# Patient Record
Sex: FEMALE | Race: WHITE | Employment: OTHER | URBAN - METROPOLITAN AREA
[De-identification: names, ages, dates, MRNs, and addresses within clinical notes are randomized per-mention and may not be internally consistent; named-entity substitution may affect disease eponyms.]

---

## 2017-02-14 ENCOUNTER — HOSPITAL ENCOUNTER (OUTPATIENT)
Dept: CT IMAGING | Age: 67
Discharge: HOME | End: 2017-02-14
Attending: FAMILY MEDICINE
Payer: MEDICARE

## 2017-02-14 DIAGNOSIS — G91.9 HYDROCEPHALUS (HCC): ICD-10-CM

## 2017-02-14 PROCEDURE — 70450 CT HEAD/BRAIN W/O DYE: CPT

## 2018-08-23 ENCOUNTER — HOSPITAL ENCOUNTER (OUTPATIENT)
Dept: ULTRASOUND IMAGING | Age: 68
Discharge: HOME | End: 2018-08-23
Attending: FAMILY MEDICINE
Payer: MEDICARE

## 2018-08-23 DIAGNOSIS — M79.89 SWOLLEN LEG: ICD-10-CM

## 2018-08-23 PROCEDURE — 93970 EXTREMITY STUDY: CPT

## 2019-02-18 ENCOUNTER — HOSPITAL ENCOUNTER (OUTPATIENT)
Dept: MRI IMAGING | Age: 69
Discharge: HOME | End: 2019-02-18
Attending: PSYCHIATRY & NEUROLOGY
Payer: MEDICARE

## 2019-02-18 DIAGNOSIS — R51.9 UNILATERAL OCCIPITAL HEADACHE: ICD-10-CM

## 2019-02-18 PROCEDURE — 70551 MRI BRAIN STEM W/O DYE: CPT

## 2020-09-30 ENCOUNTER — HOSPITAL ENCOUNTER (OUTPATIENT)
Dept: MRI IMAGING | Age: 70
Discharge: HOME | End: 2020-09-30
Attending: PHYSICAL MEDICINE & REHABILITATION
Payer: MEDICARE

## 2020-09-30 DIAGNOSIS — M51.36 DDD (DEGENERATIVE DISC DISEASE), LUMBAR: ICD-10-CM

## 2020-09-30 DIAGNOSIS — M54.50 CHRONIC LOW BACK PAIN: ICD-10-CM

## 2020-09-30 DIAGNOSIS — G89.29 CHRONIC LOW BACK PAIN: ICD-10-CM

## 2020-09-30 DIAGNOSIS — M47.816 OSTEOARTHRITIS OF LUMBAR SPINE: ICD-10-CM

## 2020-09-30 PROCEDURE — 72148 MRI LUMBAR SPINE W/O DYE: CPT

## 2020-11-27 ENCOUNTER — TRANSCRIBE ORDER (OUTPATIENT)
Dept: SCHEDULING | Age: 70
End: 2020-11-27

## 2020-11-27 DIAGNOSIS — M50.30 DDD (DEGENERATIVE DISC DISEASE), CERVICAL: ICD-10-CM

## 2020-11-27 DIAGNOSIS — M54.6 PAIN IN THORACIC SPINE: ICD-10-CM

## 2020-11-27 DIAGNOSIS — M54.2 CERVICALGIA: Primary | ICD-10-CM

## 2020-11-27 DIAGNOSIS — M41.50 DEGENERATIVE SCOLIOSIS: ICD-10-CM

## 2020-11-27 DIAGNOSIS — M51.34 DDD (DEGENERATIVE DISC DISEASE), THORACIC: Primary | ICD-10-CM

## 2021-01-09 ENCOUNTER — HOSPITAL ENCOUNTER (OUTPATIENT)
Dept: MRI IMAGING | Age: 71
Discharge: HOME | End: 2021-01-09
Attending: NURSE PRACTITIONER
Payer: MEDICARE

## 2021-01-09 DIAGNOSIS — M50.30 DDD (DEGENERATIVE DISC DISEASE), CERVICAL: ICD-10-CM

## 2021-01-09 DIAGNOSIS — M51.34 DDD (DEGENERATIVE DISC DISEASE), THORACIC: ICD-10-CM

## 2021-01-09 DIAGNOSIS — M54.6 PAIN IN THORACIC SPINE: ICD-10-CM

## 2021-01-09 DIAGNOSIS — M54.2 CERVICALGIA: ICD-10-CM

## 2021-01-09 DIAGNOSIS — M41.50 DEGENERATIVE SCOLIOSIS: ICD-10-CM

## 2021-01-09 PROCEDURE — 72141 MRI NECK SPINE W/O DYE: CPT

## 2021-01-09 PROCEDURE — 72146 MRI CHEST SPINE W/O DYE: CPT

## 2021-01-13 ENCOUNTER — TRANSCRIBE ORDER (OUTPATIENT)
Dept: SCHEDULING | Age: 71
End: 2021-01-13

## 2021-01-13 DIAGNOSIS — Z87.891 PERSONAL HISTORY OF NICOTINE DEPENDENCE: Primary | ICD-10-CM

## 2021-01-21 ENCOUNTER — HOSPITAL ENCOUNTER (OUTPATIENT)
Dept: CT IMAGING | Age: 71
Discharge: HOME | End: 2021-01-21
Attending: INTERNAL MEDICINE
Payer: MEDICARE

## 2021-01-21 DIAGNOSIS — Z87.891 PERSONAL HISTORY OF NICOTINE DEPENDENCE: ICD-10-CM

## 2021-01-21 PROCEDURE — 71271 CT THORAX LUNG CANCER SCR C-: CPT

## 2021-02-12 RX ORDER — TRAMADOL HYDROCHLORIDE 50 MG/1
50 TABLET ORAL
COMMUNITY

## 2021-02-23 ENCOUNTER — APPOINTMENT (OUTPATIENT)
Dept: GENERAL RADIOLOGY | Age: 71
End: 2021-02-23
Attending: PHYSICAL MEDICINE & REHABILITATION
Payer: MEDICARE

## 2021-02-23 ENCOUNTER — HOSPITAL ENCOUNTER (OUTPATIENT)
Age: 71
Setting detail: OUTPATIENT SURGERY
Discharge: HOSPICE | End: 2021-02-23
Attending: PHYSICAL MEDICINE & REHABILITATION | Admitting: PHYSICAL MEDICINE & REHABILITATION
Payer: MEDICARE

## 2021-02-23 VITALS
BODY MASS INDEX: 36.54 KG/M2 | RESPIRATION RATE: 18 BRPM | TEMPERATURE: 98.3 F | HEART RATE: 65 BPM | DIASTOLIC BLOOD PRESSURE: 67 MMHG | OXYGEN SATURATION: 100 % | HEIGHT: 64 IN | WEIGHT: 214 LBS | SYSTOLIC BLOOD PRESSURE: 106 MMHG

## 2021-02-23 PROCEDURE — 76210000046 HC AMBSU PH II REC FIRST 0.5 HR: Performed by: PHYSICAL MEDICINE & REHABILITATION

## 2021-02-23 PROCEDURE — 2709999900 HC NON-CHARGEABLE SUPPLY: Performed by: PHYSICAL MEDICINE & REHABILITATION

## 2021-02-23 PROCEDURE — 76030000002 HC AMB SURG OR TIME FIRST 0.: Performed by: PHYSICAL MEDICINE & REHABILITATION

## 2021-02-23 PROCEDURE — 76000 FLUOROSCOPY <1 HR PHYS/QHP: CPT

## 2021-02-23 PROCEDURE — 74011000250 HC RX REV CODE- 250: Performed by: PHYSICAL MEDICINE & REHABILITATION

## 2021-02-23 PROCEDURE — 72020 X-RAY EXAM OF SPINE 1 VIEW: CPT

## 2021-02-23 PROCEDURE — 74011250636 HC RX REV CODE- 250/636: Performed by: PHYSICAL MEDICINE & REHABILITATION

## 2021-02-23 RX ORDER — BUPIVACAINE HYDROCHLORIDE 5 MG/ML
5 INJECTION, SOLUTION EPIDURAL; INTRACAUDAL ONCE
Status: COMPLETED | OUTPATIENT
Start: 2021-02-23 | End: 2021-02-23

## 2021-02-23 RX ORDER — LIDOCAINE HYDROCHLORIDE 20 MG/ML
5 INJECTION, SOLUTION INFILTRATION; PERINEURAL ONCE
Status: COMPLETED | OUTPATIENT
Start: 2021-02-23 | End: 2021-02-23

## 2021-02-23 RX ORDER — METHYLPREDNISOLONE ACETATE 40 MG/ML
40 INJECTION, SUSPENSION INTRA-ARTICULAR; INTRALESIONAL; INTRAMUSCULAR; SOFT TISSUE ONCE
Status: COMPLETED | OUTPATIENT
Start: 2021-02-23 | End: 2021-02-23

## 2021-02-23 NOTE — DISCHARGE INSTRUCTIONS
Discharge Instructions    Lumbar Facet Block/Medial Branch Block    You had a lumbar facet injection today. You will probably have some numbness in your lower back area for the next 6-8 hours. The steroids will slowly become effective, reducing your pain, over the next 2 weeks. You should begin feeling better after a few days, but it may take up to 2 weeks to notice the difference. The benefit you get from your injection will last a variable amount of time, depending on the severity of your spine problem. If the results you experience are significant, but not lasting a long time, you may be a candidate for a procedure that can be longer lasting (radiofrequency ablation of the nerves innervating the facet joints).  Pain:  Most people do not have any increase in pain after this injection. However, you might experience some soreness at the site of the injection. If this happens, putting an icepack over the sore area will help.  Bandage: You have a small bandage covering the site of the injection. You may remove it when you get home.  Restrictions:  Someone should drive you home after the injection. After that, you have no restrictions. You may resume your normal level of activity. You may take a shower or bath, and you may eat normally. You should continue your current exercised and/or therapy routine.  Medications:  Continue your current medications as prescribed. If your pain decreases, you may reduce the amount of your pain medicines. If you stopped taking anticoagulants or blood-thinners before the injection, start them tomorrow. If you have diabetes, your blood sugar may be elevated for a few days. Call your primary doctor with any questions.     Call Dr. Elena Arita at [phone] if you experience:   Fever (101 degrees Fahrenheit or greater)   Nausea or vomiting   Headache unrelieved by your normal pain medicine   Redness or swelling at the injection site that lasts more than 1 day   New numbness, tingling, weakness, or pain that you didnt have before the injection     If still having pain in 1-2 weeks, call office at [phone] for a follow up appointment. DISCHARGE SUMMARY from Nurse    The following personal items collected during your admission are returned to you:   Dental Appliance: Dental Appliances: Partials, Uppers, Lowers  Vision: Visual Aid: None  Hearing Aid:    Jewelry:    Clothing:    Other Valuables:    Valuables sent to safe: If you were given prescriptions, please review the written information on prescribed medications. · You will receive a Post Operative Call from one of the Recovery Room Nurses on the day after your surgery to check on you. It is very important for us to know how you are recovering after your surgery. · You may receive an e-mail or letter in the mail from [employer] regarding your experience with us in the Ambulatory Surgery Unit. Your feedback is valuable to us and we appreciate your participation in the survey. If you have not had your influenza or pneumococcal vaccines, please follow up with your primary care physician. The discharge information has been reviewed with the patient. The patient verbalized understanding.

## 2021-02-23 NOTE — PERIOP NOTES
Espiridion Kanner  1950  [id number]    Situation:  Verbal report given from: Macrelo Vegas CRNA  Procedure: Procedure(s):  BILATERAL T8-9, T9-10, T10-11 FACET INJECTION    Background:    Preoperative diagnosis: THORACIC SPONDYLOSIS    Postoperative diagnosis: THORACIC SPONDYLOSIS    :  Dr. John French:  Intra-procedure medications, procedure, and allergies reviewed        Recommendation:    Discharge patient home after discharge instructions reviewed with patient. Rest until local has worn off.

## 2021-02-23 NOTE — H&P
Procedural Case Note    2/23/2021    (2:59 PM)    Belle Rbob    1950   (66 y.o.)    [id number]    CC:  pain    ROS:   Complete ROS obtained, no CP, no SOB, no N or V    PMH:     Past Medical History:   Diagnosis Date    Anxiety and depression     Arthritis     Asthma     Chronic obstructive pulmonary disease ([facility] [address]     Fibromyalgia     Pneumonia 2007    left lung     Spinal stenosis     Unspecified sleep apnea     pt no longer uses CPAP, states she sleeps with a fan in her face        ALLERGIES:     Allergies   Allergen Reactions    Lyrica [Pregabalin] Swelling    Sulfa (Sulfonamide Antibiotics) Rash and Swelling       MEDS:     No current facility-administered medications for this encounter. Visit Vitals  Ht 5' 4\" (1.626 m)   Wt 95.3 kg (210 lb)   BMI 36.05 kg/m²     PE:  Gen: NAD  Head: normocephalic  Heart: RRR  Lungs: CTA keila  Abd: NT, ND, soft  Neuro: awake and alert  Skin: intact    IMPRESSION:   Thoracic DJD    Note:  The clinical status was discussed in detail with the patient. The procedure was again discussed and described in detail. All understand and accept the planned procedure and risks; reject other forms of treatment. All questions are answered.     Jacinto Jovel MD

## 2021-02-23 NOTE — OP NOTES
Facet Steroid Injection Operative Report    Indications: This is a 66 y.o. female who presents with mid back pain. She was positive for Thoracic DJD. The patient was admitted for surgery as conservative measures have failed. Date of Surgery: 2/23/2021    Preoperative Diagnosis: Thoracic DJD    Postoperative Diagnosis: Thoracic DJD    Surgeon(s) and Role:     * Alfredo Staples MD - Primary     Procedure:  Procedure(s):  BILATERAL T8-9, T9-10, T10-11 FACET INJECTION    Procedure in Detail:  After appropriate informed consent was obtained, the patient was taken to the operating suite and placed in the prone position on the operating table on appropriate padding. The thoracic region was prepped and draped in the usual sterile fashion. Intraoperative fluoroscopy was used to localize the thoracic spine. The skin was infiltrated with 2% lidocaine. An 25-g needle was advanced into the Shilo T8-9, T9-10, T10-11 facets under fluoroscopic guidance. Next, 4 ml of 0.5% marcaine and 80mg of Depo-Medrol were injected. The needle was removed from the patient. The patient was then turned back into the supine position on the stretcher and was taken to the Recovery Room in stable condition.     Estimated Blood Loss:  none     Specimens: None       Drains: None          Complications:  None    Signed By: Kameron Durán MD                        February 23, 2021

## 2021-02-23 NOTE — PERIOP NOTES
Permission received to review discharge instructions and discuss private health information with , Danielle Singh. Hand  are equally strong, bilat. Leg/foot pushes pulls are slightly weaker to right leg.  Pt states this is normal.

## 2021-02-23 NOTE — PERIOP NOTES
Discharge instructions reviewed w/pt. She verbalized understanding. Vss. Denies pain. Injection site intact. Push/pull to lower extremities strong and equal bilaterally. Pt discharged via wheelchair to car, accompanied by RN. Pt discharged awake and alert, respirations equal and unlabored, skin warm, dry, and intact. Pt and family members' questions and concerns addressed prior to discharge.

## 2021-04-09 RX ORDER — TIOTROPIUM BROMIDE AND OLODATEROL 3.124; 2.736 UG/1; UG/1
1 SPRAY, METERED RESPIRATORY (INHALATION) 2 TIMES DAILY
COMMUNITY

## 2021-04-09 NOTE — PERIOP NOTES
[facility]  Ambulatory Surgery Unit  Pre-operative Instructions    Procedure Date  Tuesday, April 13, 2021            Tentative Arrival Time 1315      1. On the day of your procedure, please report to the Ambulatory Surgery Unit Registration Desk and sign in at your designated time. The Ambulatory Surgery Unit is located in [facility] on the [facility] side of the \[facility]\"" across from the [address] [place] [address]. Please have all of your health insurance cards and a photo ID. 2. You must have someone with you to drive you home as directed by your surgeon. 3. You may have a light breakfast and take normal morning medications. 4. We recommend you do not drink any alcoholic beverages for 24 hours before and after your procedure. 5. Contact your surgeons office for instructions on the following medications: non-steroidal anti-inflammatory drugs (i.e. Advil, Aleve), vitamins, and supplements. (Some surgeons will want you to stop these medications prior to surgery and others may allow you to take them)   **If you are currently taking Plavix, Coumadin, Aspirin and/or other blood-thinning agents, contact your surgeon for instructions. ** Your surgeon will partner with the physician prescribing these medications to determine if it is safe to stop or if you need to continue taking. Please do not stop taking these medications without instructions from your surgeon. 6. In an effort to help prevent surgical site infection, we ask that you shower with an anti-bacterial soap (i.e. Dial or Safeguard) on the morning of your procedure. Do not apply any lotions, powders, or deodorants after showering. 7. Wear comfortable clothes. Wear glasses instead of contacts. Do not bring any jewelry or money (other than copays or fees as instructed). Do not wear make-up, particularly mascara, the morning of your procedure. Wear your hair loose or down, no ponytails, buns, joan pins or clips.  All body piercings must be removed. 8. You should understand that if you do not follow these instructions your procedure may be cancelled. If your physical condition changes (i.e. fever, cold or flu) please contact your surgeon as soon as possible. 9. It is important that you be on time. If a situation occurs where you may be late, or if you have any questions or problems, please call [phone].    10. Your procedure time may be subject to change. You will receive a phone call the day prior to confirm your arrival time. I understand a pre-operative phone call will be made to verify my procedure time. In the event that I am not available, I give permission for a message to be left on my answering service and/or with another person?       yes    Preop instructions reviewed  Pt verbalized understanding.      ___________________      ___________________      ___________________  (Signature of Patient)          (Witness)                   (Date and Time)

## 2021-04-13 ENCOUNTER — APPOINTMENT (OUTPATIENT)
Dept: GENERAL RADIOLOGY | Age: 71
End: 2021-04-13
Attending: PHYSICAL MEDICINE & REHABILITATION
Payer: MEDICARE

## 2021-04-13 ENCOUNTER — HOSPITAL ENCOUNTER (OUTPATIENT)
Age: 71
Setting detail: OUTPATIENT SURGERY
Discharge: HOME | End: 2021-04-13
Attending: PHYSICAL MEDICINE & REHABILITATION | Admitting: PHYSICAL MEDICINE & REHABILITATION
Payer: MEDICARE

## 2021-04-13 VITALS
SYSTOLIC BLOOD PRESSURE: 134 MMHG | TEMPERATURE: 97.9 F | OXYGEN SATURATION: 96 % | DIASTOLIC BLOOD PRESSURE: 80 MMHG | RESPIRATION RATE: 18 BRPM | WEIGHT: 208 LBS | HEIGHT: 64 IN | HEART RATE: 65 BPM | BODY MASS INDEX: 35.51 KG/M2

## 2021-04-13 PROCEDURE — 76030000002 HC AMB SURG OR TIME FIRST 0.: Performed by: PHYSICAL MEDICINE & REHABILITATION

## 2021-04-13 PROCEDURE — 77030003665 HC NDL SPN BBMI -A: Performed by: PHYSICAL MEDICINE & REHABILITATION

## 2021-04-13 PROCEDURE — 2709999900 HC NON-CHARGEABLE SUPPLY: Performed by: PHYSICAL MEDICINE & REHABILITATION

## 2021-04-13 PROCEDURE — 72020 X-RAY EXAM OF SPINE 1 VIEW: CPT

## 2021-04-13 PROCEDURE — 74011000250 HC RX REV CODE- 250: Performed by: PHYSICAL MEDICINE & REHABILITATION

## 2021-04-13 PROCEDURE — 76000 FLUOROSCOPY <1 HR PHYS/QHP: CPT

## 2021-04-13 PROCEDURE — 76210000046 HC AMBSU PH II REC FIRST 0.5 HR: Performed by: PHYSICAL MEDICINE & REHABILITATION

## 2021-04-13 PROCEDURE — 74011250636 HC RX REV CODE- 250/636: Performed by: PHYSICAL MEDICINE & REHABILITATION

## 2021-04-13 RX ORDER — METHYLPREDNISOLONE ACETATE 40 MG/ML
80 INJECTION, SUSPENSION INTRA-ARTICULAR; INTRALESIONAL; INTRAMUSCULAR; SOFT TISSUE ONCE
Status: COMPLETED | OUTPATIENT
Start: 2021-04-13 | End: 2021-04-13

## 2021-04-13 RX ORDER — BUPIVACAINE HYDROCHLORIDE 5 MG/ML
10 INJECTION, SOLUTION EPIDURAL; INTRACAUDAL ONCE
Status: COMPLETED | OUTPATIENT
Start: 2021-04-13 | End: 2021-04-13

## 2021-04-13 RX ORDER — LIDOCAINE HYDROCHLORIDE 20 MG/ML
10 INJECTION, SOLUTION INFILTRATION; PERINEURAL ONCE
Status: COMPLETED | OUTPATIENT
Start: 2021-04-13 | End: 2021-04-13

## 2021-04-13 NOTE — H&P
Procedural Case Note    4/13/2021    (1:46 PM)    Louis Hairston    1950   (66 y.o.)    [id number]    CC:  pain    ROS:   Complete ROS obtained, no CP, no SOB, no N or V    PMH:     Past Medical History:   Diagnosis Date    Anxiety and depression     Arthritis     Asthma     Chronic obstructive pulmonary disease ([facility] [address]     Fibromyalgia     Pneumonia 2007    left lung     Spinal stenosis     Unspecified sleep apnea     pt no longer uses CPAP, states she sleeps with a fan in her face        ALLERGIES:     Allergies   Allergen Reactions    Lyrica [Pregabalin] Swelling    Sulfa (Sulfonamide Antibiotics) Rash and Swelling       MEDS:     No current facility-administered medications for this encounter. Visit Vitals  Ht 5' 4\" (1.626 m)   Wt 95.3 kg (210 lb)   BMI 36.05 kg/m²     PE:  Gen: NAD  Head: normocephalic  Heart: RRR  Lungs: CTA keila  Abd: NT, ND, soft  Neuro: awake and alert  Skin: intact    IMPRESSION:   LS DJD    Note:  The clinical status was discussed in detail with the patient. The procedure was again discussed and described in detail. All understand and accept the planned procedure and risks; reject other forms of treatment. All questions are answered.     Dhaval Sagastume MD

## 2021-04-13 NOTE — PERIOP NOTES
[facility]  1950  [id number]    Situation:  Verbal report given from: Radu Draper RN  Procedure: Procedure(s):  BILATERAL L3-4, L4-5, L5-S1 FACET INJECTION    Background:    Preoperative diagnosis: Lumbar spondylosis [M47.816]    Postoperative diagnosis: Lumbar spondylosis [M47.816]    :  Dr. Americo Yung:  Intra-procedure medications, procedure, and allergies reviewed        Recommendation:    Discharge patient home after discharge instructions reviewed with patient. Rest until local has worn off.

## 2021-04-13 NOTE — DISCHARGE INSTRUCTIONS
Discharge Instructions    Lumbar Facet Block/Medial Branch Block    You had a lumbar facet injection today. You will probably have some numbness in your lower back area for the next 6-8 hours. The steroids will slowly become effective, reducing your pain, over the next 2 weeks. You should begin feeling better after a few days, but it may take up to 2 weeks to notice the difference. The benefit you get from your injection will last a variable amount of time, depending on the severity of your spine problem. If the results you experience are significant, but not lasting a long time, you may be a candidate for a procedure that can be longer lasting (radiofrequency ablation of the nerves innervating the facet joints).  Pain:  Most people do not have any increase in pain after this injection. However, you might experience some soreness at the site of the injection. If this happens, putting an icepack over the sore area will help.  Bandage: You have a small bandage covering the site of the injection. You may remove it when you get home.  Restrictions:  Someone should drive you home after the injection. After that, you have no restrictions. You may resume your normal level of activity. You may take a shower or bath, and you may eat normally. You should continue your current exercised and/or therapy routine.  Medications:  Continue your current medications as prescribed. If your pain decreases, you may reduce the amount of your pain medicines. If you stopped taking anticoagulants or blood-thinners before the injection, start them tomorrow. If you have diabetes, your blood sugar may be elevated for a few days. Call your primary doctor with any questions.     Call Dr. John Castro at [phone] if you experience:   Fever (101 degrees Fahrenheit or greater)   Nausea or vomiting   Headache unrelieved by your normal pain medicine   Redness or swelling at the injection site that lasts more than 1 day   New numbness, tingling, weakness, or pain that you didnt have before the injection     If still having pain in 1-2 weeks, call office at [phone] for a follow up appointment. DISCHARGE SUMMARY from Nurse    The following personal items collected during your admission are returned to you:   Dental Appliance: Dental Appliances: Partials, Uppers, Lowers  Vision: Visual Aid: None  Hearing Aid:    Jewelry: Jewelry: Ring  Clothing: Clothing: At bedside  Other Valuables: Other Valuables: Eyeglasses(in PACU)  Valuables sent to safe: If you were given prescriptions, please review the written information on prescribed medications. · You will receive a Post Operative Call from one of the Recovery Room Nurses on the day after your surgery to check on you. It is very important for us to know how you are recovering after your surgery. · You may receive an e-mail or letter in the mail from [employer] regarding your experience with us in the Ambulatory Surgery Unit. Your feedback is valuable to us and we appreciate your participation in the survey. If you have not had your influenza or pneumococcal vaccines, please follow up with your primary care physician. The discharge information has been reviewed with the patient. The patient verbalized understanding.

## 2021-04-13 NOTE — PERIOP NOTES
Discharge instructions reviewed w/pt. She verbalized understanding. Vss.  C/o pain to back level 6/10, states is tolerable. Push/pull to lower extremities strong and equal bilaterally. Pt discharged via wheelchair to car, accompanied by RN. Pt discharged awake and alert, respirations equal and unlabored, skin warm, dry, and intact. Pt and family members' questions and concerns addressed prior to discharge.

## 2021-04-13 NOTE — OP NOTES
Facet Steroid Injection Operative Report    Indications: This is a 66 y.o. female who presents with LBP. She was positive for LS DJD. The patient was admitted for surgery as conservative measures have failed. Date of Surgery: 4/13/2021    Preoperative Diagnosis: LS DJD    Postoperative Diagnosis: LS DJD    Surgeon(s) and Role:     * Deysi Jay MD - Primary     Procedure:  Procedure(s):  BILATERAL L3-4, L4-5, L5-S1 FACET INJECTION    Procedure in Detail:  After appropriate informed consent was obtained, the patient was taken to the operating suite and placed in the prone position on the operating table on appropriate padding. The LS region was prepped and draped in the usual sterile fashion. Intraoperative fluoroscopy was used to localize the LS spine. The skin was infiltrated with 2% lidocaine. An 22-g needle was advanced into the Shilo L3-4, L4-5, L5-S1 facets under fluoroscopic guidance. Next, 4ml of 0.5% marcaine and 80mg of Depo-Medrol were injected. The needle was removed from the patient. The patient was then turned back into the supine position on the stretcher and was taken to the Recovery Room in stable condition.     Estimated Blood Loss:  none     Specimens: None       Drains: None          Complications:  None    Signed By: Jaren Ferrell MD                        April 13, 2021

## 2021-09-23 ENCOUNTER — TRANSCRIBE ORDER (OUTPATIENT)
Dept: SCHEDULING | Age: 71
End: 2021-09-23

## 2021-09-23 DIAGNOSIS — Z87.891 PERSONAL HISTORY OF NICOTINE DEPENDENCE: Primary | ICD-10-CM

## 2021-11-19 ENCOUNTER — TRANSCRIBE ORDER (OUTPATIENT)
Dept: SCHEDULING | Age: 71
End: 2021-11-19

## 2021-11-19 DIAGNOSIS — M81.0 SENILE OSTEOPOROSIS: Primary | ICD-10-CM

## 2022-01-14 ENCOUNTER — HOSPITAL ENCOUNTER (OUTPATIENT)
Dept: CT IMAGING | Age: 72
Discharge: HOME | End: 2022-01-14
Attending: INTERNAL MEDICINE
Payer: MEDICARE

## 2022-01-14 DIAGNOSIS — Z87.891 PERSONAL HISTORY OF NICOTINE DEPENDENCE: ICD-10-CM

## 2022-01-14 PROCEDURE — 71271 CT THORAX LUNG CANCER SCR C-: CPT

## 2022-01-17 ENCOUNTER — HOSPITAL ENCOUNTER (OUTPATIENT)
Dept: MAMMOGRAPHY | Age: 72
Discharge: HOME | End: 2022-01-17
Attending: ANESTHESIOLOGY
Payer: MEDICARE

## 2022-01-17 DIAGNOSIS — M81.0 SENILE OSTEOPOROSIS: ICD-10-CM

## 2022-01-17 PROCEDURE — 77080 DXA BONE DENSITY AXIAL: CPT

## 2023-12-01 ENCOUNTER — TRANSCRIBE ORDERS (OUTPATIENT)
Facility: HOSPITAL | Age: 73
End: 2023-12-01

## 2023-12-01 ENCOUNTER — HOSPITAL ENCOUNTER (OUTPATIENT)
Facility: HOSPITAL | Age: 73
End: 2023-12-01
Payer: MEDICARE

## 2023-12-01 DIAGNOSIS — J44.9 CHRONIC OBSTRUCTIVE PULMONARY DISEASE, UNSPECIFIED COPD TYPE (HCC): Primary | ICD-10-CM

## 2023-12-01 DIAGNOSIS — J18.9 PNEUMONIA OF LEFT LUNG DUE TO INFECTIOUS ORGANISM, UNSPECIFIED PART OF LUNG: ICD-10-CM

## 2023-12-01 DIAGNOSIS — J44.9 CHRONIC OBSTRUCTIVE PULMONARY DISEASE, UNSPECIFIED COPD TYPE (HCC): ICD-10-CM

## 2023-12-01 PROCEDURE — 71046 X-RAY EXAM CHEST 2 VIEWS: CPT

## 2023-12-08 ENCOUNTER — TRANSCRIBE ORDERS (OUTPATIENT)
Facility: HOSPITAL | Age: 73
End: 2023-12-08

## 2023-12-08 ENCOUNTER — HOSPITAL ENCOUNTER (OUTPATIENT)
Facility: HOSPITAL | Age: 73
End: 2023-12-08
Payer: MEDICARE

## 2023-12-08 DIAGNOSIS — J18.9 PNEUMONIA DUE TO INFECTIOUS ORGANISM, UNSPECIFIED LATERALITY, UNSPECIFIED PART OF LUNG: ICD-10-CM

## 2023-12-08 DIAGNOSIS — J18.9 PNEUMONIA DUE TO INFECTIOUS ORGANISM, UNSPECIFIED LATERALITY, UNSPECIFIED PART OF LUNG: Primary | ICD-10-CM

## 2023-12-08 PROCEDURE — 71046 X-RAY EXAM CHEST 2 VIEWS: CPT

## (undated) DEVICE — STERILE POLYISOPRENE POWDER-FREE SURGICAL GLOVES: Brand: PROTEXIS

## (undated) DEVICE — PREP SKN CHLRAPRP APL 26ML STR --

## (undated) DEVICE — SYR 10ML LUER LOK 1/5ML GRAD --

## (undated) DEVICE — SYR 5ML 1/5 GRAD LL NSAF LF --

## (undated) DEVICE — MARKER,SKIN,WI/RULER AND LABELS: Brand: MEDLINE

## (undated) DEVICE — POLYLINED TOWEL: Brand: CONVERTORS

## (undated) DEVICE — ADULT SPO2 SENSOR: Brand: NELLCOR

## (undated) DEVICE — NEEDLE SPNL L4.75IN OD25GA QNCKE TYP SPINOCAN

## (undated) DEVICE — SET EXTN PRIMING 0.59ML 8.5IN 1.55LB PRSS RATE MINIBOR PUR

## (undated) DEVICE — TOWEL SURG W17XL27IN STD BLU COT NONFENESTRATED PREWASHED

## (undated) DEVICE — NEEDLE HYPO 25GA L1.5IN BVL ORIENTED ECLIPSE

## (undated) DEVICE — NEEDLE HYPO 18GA L1.5IN PNK S STL HUB POLYPR SHLD REG BVL